# Patient Record
Sex: FEMALE | Race: WHITE | Employment: OTHER | URBAN - METROPOLITAN AREA
[De-identification: names, ages, dates, MRNs, and addresses within clinical notes are randomized per-mention and may not be internally consistent; named-entity substitution may affect disease eponyms.]

---

## 2018-01-01 ENCOUNTER — OFFICE VISIT (OUTPATIENT)
Dept: FAMILY MEDICINE CLINIC | Age: 78
End: 2018-01-01
Payer: MEDICARE

## 2018-01-01 ENCOUNTER — TELEPHONE (OUTPATIENT)
Dept: ADMINISTRATIVE | Age: 78
End: 2018-01-01

## 2018-01-01 VITALS
WEIGHT: 143 LBS | OXYGEN SATURATION: 98 % | HEART RATE: 82 BPM | HEIGHT: 64 IN | RESPIRATION RATE: 20 BRPM | DIASTOLIC BLOOD PRESSURE: 68 MMHG | BODY MASS INDEX: 24.41 KG/M2 | SYSTOLIC BLOOD PRESSURE: 124 MMHG

## 2018-01-01 VITALS
OXYGEN SATURATION: 98 % | HEART RATE: 84 BPM | DIASTOLIC BLOOD PRESSURE: 64 MMHG | HEIGHT: 64 IN | SYSTOLIC BLOOD PRESSURE: 112 MMHG | WEIGHT: 143 LBS | BODY MASS INDEX: 24.41 KG/M2

## 2018-01-01 VITALS
SYSTOLIC BLOOD PRESSURE: 136 MMHG | HEIGHT: 63 IN | DIASTOLIC BLOOD PRESSURE: 82 MMHG | HEART RATE: 67 BPM | BODY MASS INDEX: 25.16 KG/M2 | OXYGEN SATURATION: 99 % | WEIGHT: 142 LBS

## 2018-01-01 VITALS
BODY MASS INDEX: 24.07 KG/M2 | HEIGHT: 64 IN | WEIGHT: 141 LBS | TEMPERATURE: 89 F | DIASTOLIC BLOOD PRESSURE: 70 MMHG | SYSTOLIC BLOOD PRESSURE: 124 MMHG | OXYGEN SATURATION: 99 %

## 2018-01-01 DIAGNOSIS — C91.10 CLL (CHRONIC LYMPHOCYTIC LEUKEMIA) (HCC): ICD-10-CM

## 2018-01-01 DIAGNOSIS — B02.29 POSTHERPETIC NEURALGIA: ICD-10-CM

## 2018-01-01 DIAGNOSIS — R21 RASH: Primary | ICD-10-CM

## 2018-01-01 DIAGNOSIS — D69.2: Primary | ICD-10-CM

## 2018-01-01 DIAGNOSIS — Z76.89 ENCOUNTER TO ESTABLISH CARE: Primary | ICD-10-CM

## 2018-01-01 DIAGNOSIS — Z91.81 AT HIGH RISK FOR FALLS: ICD-10-CM

## 2018-01-01 PROCEDURE — 4040F PNEUMOC VAC/ADMIN/RCVD: CPT | Performed by: FAMILY MEDICINE

## 2018-01-01 PROCEDURE — G8427 DOCREV CUR MEDS BY ELIG CLIN: HCPCS | Performed by: FAMILY MEDICINE

## 2018-01-01 PROCEDURE — 1036F TOBACCO NON-USER: CPT | Performed by: FAMILY MEDICINE

## 2018-01-01 PROCEDURE — G8484 FLU IMMUNIZE NO ADMIN: HCPCS | Performed by: FAMILY MEDICINE

## 2018-01-01 PROCEDURE — G8400 PT W/DXA NO RESULTS DOC: HCPCS | Performed by: FAMILY MEDICINE

## 2018-01-01 PROCEDURE — 1123F ACP DISCUSS/DSCN MKR DOCD: CPT | Performed by: FAMILY MEDICINE

## 2018-01-01 PROCEDURE — 1090F PRES/ABSN URINE INCON ASSESS: CPT | Performed by: FAMILY MEDICINE

## 2018-01-01 PROCEDURE — 99213 OFFICE O/P EST LOW 20 MIN: CPT | Performed by: FAMILY MEDICINE

## 2018-01-01 PROCEDURE — G8420 CALC BMI NORM PARAMETERS: HCPCS | Performed by: FAMILY MEDICINE

## 2018-01-01 PROCEDURE — 1101F PT FALLS ASSESS-DOCD LE1/YR: CPT | Performed by: FAMILY MEDICINE

## 2018-01-01 PROCEDURE — 99203 OFFICE O/P NEW LOW 30 MIN: CPT | Performed by: FAMILY MEDICINE

## 2018-01-01 PROCEDURE — G8419 CALC BMI OUT NRM PARAM NOF/U: HCPCS | Performed by: FAMILY MEDICINE

## 2018-01-01 RX ORDER — GABAPENTIN 300 MG/1
300 CAPSULE ORAL 3 TIMES DAILY
COMMUNITY
End: 2018-01-01 | Stop reason: SDUPTHER

## 2018-01-01 RX ORDER — VANCOMYCIN HYDROCHLORIDE 125 MG/1
125 CAPSULE ORAL EVERY OTHER DAY
COMMUNITY

## 2018-01-01 RX ORDER — GABAPENTIN 300 MG/1
600 CAPSULE ORAL 3 TIMES DAILY
Qty: 180 CAPSULE | Refills: 1 | Status: SHIPPED | OUTPATIENT
Start: 2018-01-01 | End: 2019-01-01

## 2018-01-01 RX ORDER — VALACYCLOVIR HYDROCHLORIDE 1 G/1
1000 TABLET, FILM COATED ORAL 3 TIMES DAILY
Qty: 21 TABLET | Refills: 0 | Status: CANCELLED | OUTPATIENT
Start: 2018-01-01

## 2018-01-01 RX ORDER — PREDNISOLONE ACETATE 10 MG/ML
2 SUSPENSION/ DROPS OPHTHALMIC 4 TIMES DAILY
COMMUNITY
End: 2019-01-01

## 2018-01-01 ASSESSMENT — ENCOUNTER SYMPTOMS
WHEEZING: 0
COUGH: 0
ALLERGIC/IMMUNOLOGIC NEGATIVE: 1
COUGH: 0
ABDOMINAL PAIN: 0
NAUSEA: 0
SHORTNESS OF BREATH: 0
SHORTNESS OF BREATH: 0
WHEEZING: 0
SHORTNESS OF BREATH: 0
SHORTNESS OF BREATH: 0
SORE THROAT: 0
BLOOD IN STOOL: 0
COUGH: 0
ANAL BLEEDING: 0
ALLERGIC/IMMUNOLOGIC NEGATIVE: 1
BLOOD IN STOOL: 0
BACK PAIN: 0
ALLERGIC/IMMUNOLOGIC NEGATIVE: 1
GASTROINTESTINAL NEGATIVE: 1
COUGH: 0
CHEST TIGHTNESS: 0
ABDOMINAL PAIN: 0

## 2018-01-01 ASSESSMENT — PATIENT HEALTH QUESTIONNAIRE - PHQ9
SUM OF ALL RESPONSES TO PHQ QUESTIONS 1-9: 2
2. FEELING DOWN, DEPRESSED OR HOPELESS: 1
SUM OF ALL RESPONSES TO PHQ9 QUESTIONS 1 & 2: 2
1. LITTLE INTEREST OR PLEASURE IN DOING THINGS: 1
SUM OF ALL RESPONSES TO PHQ QUESTIONS 1-9: 2

## 2018-04-06 ENCOUNTER — HOSPITAL ENCOUNTER (OUTPATIENT)
Age: 78
Setting detail: SPECIMEN
Discharge: HOME | End: 2018-04-06
Payer: MEDICARE

## 2018-08-21 NOTE — PROGRESS NOTES
Brianda Shin   Patient is a 70y.o. year old female who presents with:  Chief Complaint   Patient presents with   [id number] [facility] Records pt requested from other PCP     Patient also follows with: Dr. Jacquelyn Turner (oncology - ), Dr. Costa Nolen ([facility]), Dr. Ailin Dallas (chicken pox of the right eye)    Had been seeing Dr. Tyrese Anaya, last visit 8/13/18    HPI    Shingles, postherpetic neuralgia: diagnosed 4/2018 at the left upper back, left shoulder. Had been on acyclovir since 2016 for prophylaxis which has since been resumed. Patient complains of pain in the distribution of the the previous shingles outbreak. Pain is stable, 8/10 in intensity. Exacerbated with light touch, clothes touching the area. Remitting factors include tramadol. Tylenol, topical steroids have been ineffective. Gabapentin was increased from 200 mg 3 times a day to 300 mg 3 times a day 8/13/18 with no improvement. Began gabapentin d/t neuralgia secondary to chemotherapy. Does not feel it is helpful for the neuralgia. 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 Stable in remission since 1/2017. Sees Dr. Renate Hall every three months, goes every six weeks for port flush and bloodwork. Increased gabapentin from 600 TID to 900 TID 8/13/18 with no effect. Pain remains 8-10 in intensity. Only improvement was with tramadol. Pain is worse with light touch, clothing. Follows with ID, has had Cdiff, sepsis, MRSA. States c diff has been most problematic with three infections. Currently being treated with vancomycin 125mg every other day for at least one year, will reevaluate in one year. Printed lab results from [employer] were provided and reviewed today. Patient states blood work is done prior oncology every 6 weeks. Review of Systems   Constitutional: Positive for unexpected weight change (loss of 60lbs over past two years). Negative for chills and fatigue. Eyes: Positive for visual disturbance. Respiratory: Negative for cough, shortness of breath and wheezing. Cardiovascular: Negative for chest pain, palpitations and leg swelling. Gastrointestinal: Negative for abdominal pain and blood in stool. Genitourinary: Negative for dysuria and frequency. Musculoskeletal: Negative for arthralgias, back pain and neck pain. Neurological: Positive for numbness (feet and hands). Negative for weakness. Psychiatric/Behavioral: Negative for dysphoric mood and sleep disturbance. The patient is not nervous/anxious.       Health Maintenance Due   Topic Date Due    DTaP/Tdap/Td vaccine (1 - Tdap) 06[birth year]17/1961    DEXA (modify frequency per FRAX score)  06/17/2007    Pneumococcal high/highest risk (1 of 2 - PCV13) 06/17/2007     History    Social History     Social History    Marital status:      Spouse name: N/A    Number of children: N/A    Years of education: N/A     Occupational History    retired      Social History Main Topics    Smoking status: Never Smoker    Smokeless tobacco: Never Used    Alcohol use No    Drug use: No    Sexual activity: Yes     Partners: Male     Other Topics Concern    None     Social History Narrative    None     Medical      Diagnosis Date    ALL (acute lymphoblastic leukemia) (HCC)      Allergies  Allergies   Allergen Reactions    Ciprofloxacin Other (See Comments)     Bone pain    Lasix [Furosemide] Other (See Comments)     Headaches and Intolerance    Augmentin [Amoxicillin-Pot Clavulanate] Rash    Keflex [Cephalexin] Rash     Surgical  Past Surgical History:   Procedure Laterality Date    CATARACT REMOVAL      COLONOSCOPY      HYSTERECTOMY      KNEE CARTILAGE SURGERY Right      Family  Family History   Problem Relation Age of Onset    Diabetes Mother     Cancer Father     Cancer Sister      Medications:   Current Outpatient Prescriptions   Medication Sig Dispense Refill    prednisoLONE acetate (PRED FORTE) 1 % ophthalmic suspension Place 2 drops into the right eye 4 times daily      vancomycin (VANCOCIN) 125 MG capsule Take 125 mg by mouth every other day      gabapentin (NEURONTIN) 300 MG capsule Take 2 capsules by mouth 3 times daily for 60 days. . 180 capsule 1    Lidocaine 4 % GEL Apply to affected area as needed for pain 1 Tube 3    acyclovir (ZOVIRAX) 400 MG tablet Take 400 mg by mouth 2 times daily      Multiple Vitamins-Minerals (THERAPEUTIC MULTIVITAMIN-MINERALS) tablet Take 1 tablet by mouth daily      dasatinib (SPRYCEL) 100 MG chemo tablet Take 100 mg by mouth nightly       calcium carbonate-vitamin D (CALTRATE 600+D) 600-400 MG-UNIT TABS per tab Take 1 tablet by mouth daily      prochlorperazine (COMPAZINE) 10 MG tablet Take 10 mg by mouth every 6 hours as needed      polyethyl glycol-propyl glycol 0.4-0.3 % (SYSTANE) 0.4-0.3 % ophthalmic solution Place 1 drop into both eyes daily      pantoprazole (PROTONIX) 20 MG tablet Take 20 mg by mouth daily as needed (stomach ulcers)       prochlorperazine (COMPAZINE) 10 MG tablet Take 10 mg by mouth daily        No current facility-administered medications for this visit. OBJECTIVE    /68 (Site: Left Arm, Position: Sitting, Cuff Size: Medium Adult)   Pulse 82   Resp 20   Ht 5' 3.5\" (1.613 m)   Wt 143 lb (64.9 kg)   LMP  (LMP Unknown)   SpO2 98%   BMI 24.93 kg/m²     Wt Readings from Last 3 Encounters:   08/21/18 143 lb (64.9 kg)   12/30/16 191 lb (86.6 kg)   11/17/16 180 lb (81.6 kg)       Physical Exam   Constitutional: She is oriented to person, place, and time. No distress. HENT:   Head: Normocephalic and atraumatic. Right Ear: External ear normal.   Left Ear: External ear normal.   Nose: Nose normal.   Mouth/Throat: Oropharynx is clear and moist.   Eyes: Pupils are equal, round, and reactive to light. Conjunctivae are normal.   Neck: Neck supple. Carotid bruit is present (R). No thyromegaly present.    Cardiovascular: Normal rate, regular rhythm and intact distal pulses. Murmur heard. Systolic murmur is present with a grade of 1/6   Pulmonary/Chest: Effort normal and breath sounds normal.   Abdominal: Soft. Bowel sounds are normal. There is no tenderness. Musculoskeletal: She exhibits no edema. Lymphadenopathy:     She has no cervical adenopathy. Neurological: She is alert and oriented to person, place, and time. She has normal strength and normal reflexes. No cranial nerve deficit or sensory deficit. Skin: Skin is warm and dry. She is not diaphoretic. Area illustrated with faint, well healing rash. Area is tender to light palpation. Psychiatric: She has a normal mood and affect. Her behavior is normal.   Osteopathic: Normal kyphotic and lordotic curves. No acute somatic dysfunction of the cervical, thoracic, or lumbar spine. ASSESSMENT AND PLAN    1. Encounter to establish care    2. Postherpetic neuralgia  Increase gabapentin dose, begin topical lidocaine. Discussed instructions for gradually increasing gabapentin; week 1:300 mg a.m., 300 mg noon, 600 mg p.m. Week 2:600 mg a.m., 300 mg noon 600milligrams p.m. Week 3 600 mg 3 times daily. Advised patient that if she expresses any adverse effects with increasing the dose to call the office. If no improvement with increase in the dose may consider transitioning to a different treatment including possibly Elavil.  - gabapentin (NEURONTIN) 300 MG capsule; Take 2 capsules by mouth 3 times daily for 60 days. .  Dispense: 180 capsule; Refill: 1  - Lidocaine 4 % GEL; Apply to affected area as needed for pain  Dispense: 1 Tube; Refill: 3    3.  CLL (chronic lymphocytic leukemia) ([facility] [address]  As per oncology    Return in about 1 month (around 9/21/2018) for follow-up.      Call or go to ED immediately if symptoms worsen or persist.    Educational materials and/or home exercises printed for patient's review and were included in patient instructions on his/her After Visit Summary

## 2018-09-05 NOTE — PROGRESS NOTES
ear normal.   Eyes: Conjunctivae are normal.   Neck: Neck supple. Cardiovascular: Normal rate, regular rhythm and intact distal pulses. Murmur heard. Pulmonary/Chest: Effort normal and breath sounds normal.   Abdominal: Soft. Bowel sounds are normal. There is no tenderness. Musculoskeletal: She exhibits no edema. Neurological: She is alert and oriented to person, place, and time. No cranial nerve deficit. Skin: Skin is warm and dry. Rash (petechial/purpuric rash as illustrated) noted. She is not diaphoretic. /64 (Site: Right Arm, Position: Sitting, Cuff Size: Medium Adult)   Pulse 84   Ht 5' 3.5\" (1.613 m)   Wt 143 lb (64.9 kg)   LMP  (LMP Unknown)   SpO2 98%   BMI 24.93 kg/m²     Assessment and Plan:      1. Purpura of skin due to mechanical force [facility])  Patient with hx of CLL and known thrombocytopenia. Petechial, purpuric rash following distribution as illustrated were she had been scratching her back. No other petechia, purpura, ecchymoses noted on exam. Reports blood work done one week ago was stable. Reassured the patient and her , advised patient to refrain from scratching area. Advised to call if any other rashes were to develop as this could indicate an underlying systemic process or worsening thrombocytopenia. Refill triamcinolone and apply to the area as needed to help control pruritus. - triamcinolone (KENALOG) 0.1 % ointment; Apply to affected area twice daily as needed for itching for up to 7 days.   Dispense: 1 Tube; Refill: 1    Call or go to ED immediately if symptoms worsen or persist.    Educational materials and/or home exercises printed for patient's review and were included in patient instructions on his/her After Visit Summary and given to patient at the end of visit.       Counseled regarding above diagnosis, including possible risks and complications, especially if left uncontrolled.     Counseled regarding the possible side effects, risks, benefits and alternatives to treatment; patient and/or guardian verbalizes understanding, agrees, feels comfortable with and wishes to proceed with above treatment plan.     Advised patient to call with any new medication issues, and read all Rx info from pharmacy to assure aware of all possible risks and side effects of medication before taking. Return for follow-up from previous visit, or sooner as needed.     Perfecto HAWK,   09/05/18  4:59 PM

## 2018-10-16 NOTE — PROGRESS NOTES
Wt 141 lb (64 kg)   LMP  (LMP Unknown)   SpO2 99%   BMI 24.59 kg/m²     Wt Readings from Last 3 Encounters:   10/16/18 141 lb (64 kg)   09/05/18 143 lb (64.9 kg)   08/21/18 143 lb (64.9 kg)       Physical Exam   Constitutional: She is oriented to person, place, and time. No distress. HENT:   Head: Normocephalic and atraumatic. Right Ear: External ear normal.   Left Ear: External ear normal.   Eyes: Conjunctivae are normal.   Neck: Neck supple. No thyromegaly present. Cardiovascular: Normal rate, regular rhythm and intact distal pulses. Murmur heard. Systolic murmur is present with a grade of 1/6   Pulmonary/Chest: Effort normal and breath sounds normal.   Abdominal: Soft. Bowel sounds are normal. There is no tenderness. Musculoskeletal: She exhibits no edema. Lymphadenopathy:     She has no cervical adenopathy. Neurological: She is alert and oriented to person, place, and time. She has normal reflexes. No cranial nerve deficit. Skin: Skin is warm and dry. She is not diaphoretic. Area illustrated with faint erythema, tender to light palpation. Right upper chest with well delineated area of erythema   Psychiatric: She has a normal mood and affect. Her behavior is normal.   Osteopathic: Normal kyphotic and lordotic curves. No acute somatic dysfunction of the cervical, thoracic, or lumbar spine. ASSESSMENT AND PLAN    1. Rash  Patient is a somewhat poor historian. Review of medical records suggests that triamcinolone may have been effective for the rash. Continue valacyclovir, restart triamcinolone and refer to dermatology for further evaluation and management. - triamcinolone (KENALOG) 0.1 % ointment; Apply to affected area twice daily as needed  Dispense: 1 Tube; Refill: 2  - External Referral To Dermatology    2.  At high risk for falls  Refer to physical therapy  - [place]    Return in about 2 months (around 12/16/2018) for follow-up, or sooner as

## 2018-12-17 PROBLEM — B02.29 POSTHERPETIC NEURALGIA: Status: ACTIVE | Noted: 2018-01-01

## 2018-12-17 NOTE — PROGRESS NOTES
 Ciprofloxacin Other (See Comments)     Bone pain    Lasix [Furosemide] Other (See Comments)     Headaches and Intolerance    Augmentin [Amoxicillin-Pot Clavulanate] Rash    Keflex [Cephalexin] Rash     Surgical  Past Surgical History:   Procedure Laterality Date    CATARACT REMOVAL      COLONOSCOPY      HYSTERECTOMY      KNEE CARTILAGE SURGERY Right      Family  Family History   Problem Relation Age of Onset    Diabetes Mother     Cancer Father     Cancer Sister      Medications:   Current Outpatient Prescriptions   Medication Sig Dispense Refill    triamcinolone (KENALOG) 0.1 % ointment Apply to affected area twice daily as needed 1 Tube 2    prednisoLONE acetate (PRED FORTE) 1 % ophthalmic suspension Place 2 drops into the right eye 4 times daily      gabapentin (NEURONTIN) 300 MG capsule Take 2 capsules by mouth 3 times daily for 60 days. . 180 capsule 1    Lidocaine 4 % GEL Apply to affected area as needed for pain 1 Tube 3    calcium carbonate-vitamin D (CALTRATE 600+D) 600-400 MG-UNIT TABS per tab Take 1 tablet by mouth daily      prochlorperazine (COMPAZINE) 10 MG tablet Take 10 mg by mouth every 6 hours as needed      polyethyl glycol-propyl glycol 0.4-0.3 % (SYSTANE) 0.4-0.3 % ophthalmic solution Place 1 drop into both eyes daily      acyclovir (ZOVIRAX) 400 MG tablet Take 400 mg by mouth 2 times daily      Multiple Vitamins-Minerals (THERAPEUTIC MULTIVITAMIN-MINERALS) tablet Take 1 tablet by mouth daily      pantoprazole (PROTONIX) 20 MG tablet Take 20 mg by mouth daily as needed (stomach ulcers)       dasatinib (SPRYCEL) 100 MG chemo tablet Take 100 mg by mouth nightly       vancomycin (VANCOCIN) 125 MG capsule Take 125 mg by mouth every other day       No current facility-administered medications for this visit.         OBJECTIVE    /82 (Site: Left Upper Arm, Position: Sitting, Cuff Size: Medium Adult)   Pulse 67   Ht 5' 3\" (1.6 m)   Wt 142 lb (64.4 kg)   LMP  (LMP understanding, agrees, feels comfortable with and wishes to proceed with above treatment plan.     Advised patient to call with any new medication issues, and read all Rx info from pharmacy to assure aware of all possible risks and side effects of medication before taking.     Patrizia Starr DO  12/17/18  8:37 PM

## 2019-01-01 ENCOUNTER — OFFICE VISIT (OUTPATIENT)
Dept: FAMILY MEDICINE CLINIC | Age: 79
End: 2019-01-01
Payer: MEDICARE

## 2019-01-01 ENCOUNTER — HOSPITAL ENCOUNTER (OUTPATIENT)
Age: 79
Discharge: HOME | End: 2019-05-18
Payer: MEDICARE

## 2019-01-01 ENCOUNTER — HOSPITAL ENCOUNTER (EMERGENCY)
Age: 79
Discharge: ANOTHER HOSPITAL | End: 2019-05-17
Attending: EMERGENCY MEDICINE
Payer: MEDICARE

## 2019-01-01 ENCOUNTER — APPOINTMENT (OUTPATIENT)
Dept: CT IMAGING | Age: 79
End: 2019-01-01
Payer: MEDICARE

## 2019-01-01 ENCOUNTER — TELEPHONE (OUTPATIENT)
Dept: FAMILY MEDICINE CLINIC | Age: 79
End: 2019-01-01

## 2019-01-01 VITALS
HEART RATE: 98 BPM | BODY MASS INDEX: 26.93 KG/M2 | OXYGEN SATURATION: 96 % | DIASTOLIC BLOOD PRESSURE: 62 MMHG | SYSTOLIC BLOOD PRESSURE: 104 MMHG | HEIGHT: 63 IN

## 2019-01-01 VITALS
RESPIRATION RATE: 16 BRPM | WEIGHT: 150 LBS | TEMPERATURE: 97.6 F | HEART RATE: 78 BPM | HEIGHT: 63 IN | SYSTOLIC BLOOD PRESSURE: 140 MMHG | DIASTOLIC BLOOD PRESSURE: 70 MMHG | BODY MASS INDEX: 26.58 KG/M2 | OXYGEN SATURATION: 93 %

## 2019-01-01 VITALS
WEIGHT: 152 LBS | BODY MASS INDEX: 26.93 KG/M2 | HEIGHT: 63 IN | OXYGEN SATURATION: 93 % | DIASTOLIC BLOOD PRESSURE: 72 MMHG | SYSTOLIC BLOOD PRESSURE: 130 MMHG | HEART RATE: 95 BPM

## 2019-01-01 DIAGNOSIS — R29.898 WEAKNESS OF BOTH LOWER EXTREMITIES: Primary | ICD-10-CM

## 2019-01-01 DIAGNOSIS — D72.829 LEUKOCYTOSIS, UNSPECIFIED TYPE: ICD-10-CM

## 2019-01-01 DIAGNOSIS — N28.9 ACUTE RENAL INSUFFICIENCY: ICD-10-CM

## 2019-01-01 DIAGNOSIS — R53.1 GENERALIZED WEAKNESS: Primary | ICD-10-CM

## 2019-01-01 DIAGNOSIS — J06.9 VIRAL URI: Primary | ICD-10-CM

## 2019-01-01 DIAGNOSIS — J90 RECURRENT RIGHT PLEURAL EFFUSION: ICD-10-CM

## 2019-01-01 DIAGNOSIS — R29.898 WEAKNESS OF BOTH LOWER EXTREMITIES: ICD-10-CM

## 2019-01-01 LAB
ALBUMIN SERPL-MCNC: 4 G/DL (ref 3.5–5.2)
ALBUMIN SERPL-MCNC: 4 G/DL (ref 3.5–5.2)
ALP BLD-CCNC: 63 U/L (ref 35–104)
ALP BLD-CCNC: 65 U/L (ref 35–104)
ALT SERPL-CCNC: 14 U/L (ref 0–32)
ALT SERPL-CCNC: 15 U/L (ref 0–32)
AMORPHOUS: ABNORMAL
ANION GAP SERPL CALCULATED.3IONS-SCNC: 14 MMOL/L (ref 7–16)
ANION GAP SERPL CALCULATED.3IONS-SCNC: 20 MMOL/L (ref 7–16)
ANISOCYTOSIS: ABNORMAL
APTT: 30.4 SEC (ref 24.5–35.1)
AST SERPL-CCNC: 49 U/L (ref 0–31)
AST SERPL-CCNC: 54 U/L (ref 0–31)
BACTERIA: ABNORMAL /HPF
BASOPHILS ABSOLUTE: 0 E9/L (ref 0–0.2)
BASOPHILS RELATIVE PERCENT: 0 % (ref 0–2)
BILIRUB SERPL-MCNC: 0.4 MG/DL (ref 0–1.2)
BILIRUB SERPL-MCNC: 0.5 MG/DL (ref 0–1.2)
BILIRUBIN URINE: ABNORMAL
BLASTS RELATIVE PERCENT: 72 % (ref 0–0)
BLOOD, URINE: NEGATIVE
BUN BLDV-MCNC: 12 MG/DL (ref 8–23)
BUN BLDV-MCNC: 14 MG/DL (ref 8–23)
CALCIUM SERPL-MCNC: 10.4 MG/DL (ref 8.6–10.2)
CALCIUM SERPL-MCNC: 10.8 MG/DL (ref 8.6–10.2)
CHLORIDE BLD-SCNC: 102 MMOL/L (ref 98–107)
CHLORIDE BLD-SCNC: 98 MMOL/L (ref 98–107)
CLARITY: CLEAR
CO2: 20 MMOL/L (ref 22–29)
CO2: 26 MMOL/L (ref 22–29)
COLOR: YELLOW
CREAT SERPL-MCNC: 1.6 MG/DL (ref 0.5–1)
CREAT SERPL-MCNC: 1.7 MG/DL (ref 0.5–1)
EKG ATRIAL RATE: 83 BPM
EKG P AXIS: 45 DEGREES
EKG P-R INTERVAL: 142 MS
EKG Q-T INTERVAL: 412 MS
EKG QRS DURATION: 136 MS
EKG QTC CALCULATION (BAZETT): 484 MS
EKG R AXIS: 82 DEGREES
EKG T AXIS: 8 DEGREES
EKG VENTRICULAR RATE: 83 BPM
EOSINOPHILS ABSOLUTE: 0 E9/L (ref 0.05–0.5)
EOSINOPHILS RELATIVE PERCENT: 0 % (ref 0–6)
EPITHELIAL CELLS, UA: ABNORMAL /HPF
FOLATE: 17.4 NG/ML (ref 4.8–24.2)
GFR AFRICAN AMERICAN: 35
GFR AFRICAN AMERICAN: 38
GFR NON-AFRICAN AMERICAN: 29 ML/MIN/1.73
GFR NON-AFRICAN AMERICAN: 31 ML/MIN/1.73
GLUCOSE BLD-MCNC: 102 MG/DL (ref 74–99)
GLUCOSE BLD-MCNC: 124 MG/DL (ref 74–99)
GLUCOSE URINE: NEGATIVE MG/DL
HCT VFR BLD CALC: 32.9 % (ref 34–48)
HCT VFR BLD CALC: 38.6 % (ref 34–48)
HEMOGLOBIN: 10.5 G/DL (ref 11.5–15.5)
HEMOGLOBIN: 11.8 G/DL (ref 11.5–15.5)
INR BLD: 1
KETONES, URINE: ABNORMAL MG/DL
LEUKOCYTE ESTERASE, URINE: ABNORMAL
LYMPHOCYTES ABSOLUTE: 49.99 E9/L (ref 1.5–4)
LYMPHOCYTES RELATIVE PERCENT: 24 % (ref 20–42)
MAGNESIUM: 1.9 MG/DL (ref 1.6–2.6)
MCH RBC QN AUTO: 29.8 PG (ref 26–35)
MCH RBC QN AUTO: 30.4 PG (ref 26–35)
MCHC RBC AUTO-ENTMCNC: 30.6 % (ref 32–34.5)
MCHC RBC AUTO-ENTMCNC: 31.9 % (ref 32–34.5)
MCV RBC AUTO: 93.5 FL (ref 80–99.9)
MCV RBC AUTO: 99.5 FL (ref 80–99.9)
MONOCYTES ABSOLUTE: 2.08 E9/L (ref 0.1–0.95)
MONOCYTES RELATIVE PERCENT: 1 % (ref 2–12)
NEUTROPHILS ABSOLUTE: 6.25 E9/L (ref 1.8–7.3)
NEUTROPHILS RELATIVE PERCENT: 3 % (ref 43–80)
NITRITE, URINE: NEGATIVE
PDW BLD-RTO: 16.8 FL (ref 11.5–15)
PDW BLD-RTO: 17.2 FL (ref 11.5–15)
PH UA: 5 (ref 5–9)
PHOSPHORUS: 1.7 MG/DL (ref 2.5–4.5)
PLATELET # BLD: 104 E9/L (ref 130–450)
PLATELET # BLD: 105 E9/L (ref 130–450)
PMV BLD AUTO: 10.6 FL (ref 7–12)
PMV BLD AUTO: 9.7 FL (ref 7–12)
POLYCHROMASIA: ABNORMAL
POTASSIUM SERPL-SCNC: 3.8 MMOL/L (ref 3.5–5)
POTASSIUM SERPL-SCNC: 3.8 MMOL/L (ref 3.5–5)
PRO-BNP: 979 PG/ML (ref 0–450)
PROTEIN UA: ABNORMAL MG/DL
PROTHROMBIN TIME: 11.6 SEC (ref 9.3–12.4)
RBC # BLD: 3.52 E12/L (ref 3.5–5.5)
RBC # BLD: 3.88 E12/L (ref 3.5–5.5)
RBC UA: ABNORMAL /HPF (ref 0–2)
RENAL EPITHELIAL, UA: ABNORMAL /HPF
SODIUM BLD-SCNC: 138 MMOL/L (ref 132–146)
SODIUM BLD-SCNC: 142 MMOL/L (ref 132–146)
SPECIFIC GRAVITY UA: 1.02 (ref 1–1.03)
TOTAL PROTEIN: 7.6 G/DL (ref 6.4–8.3)
TOTAL PROTEIN: 7.9 G/DL (ref 6.4–8.3)
TROPONIN: 0.02 NG/ML (ref 0–0.03)
URINE CULTURE, ROUTINE: NORMAL
UROBILINOGEN, URINE: 0.2 E.U./DL
VITAMIN B-12: 864 PG/ML (ref 211–946)
WBC # BLD: 173.5 E9/L (ref 4.5–11.5)
WBC # BLD: 208.3 E9/L (ref 4.5–11.5)
WBC UA: ABNORMAL /HPF (ref 0–5)

## 2019-01-01 PROCEDURE — 84100 ASSAY OF PHOSPHORUS: CPT

## 2019-01-01 PROCEDURE — 80053 COMPREHEN METABOLIC PANEL: CPT

## 2019-01-01 PROCEDURE — G8427 DOCREV CUR MEDS BY ELIG CLIN: HCPCS | Performed by: FAMILY MEDICINE

## 2019-01-01 PROCEDURE — G8400 PT W/DXA NO RESULTS DOC: HCPCS | Performed by: FAMILY MEDICINE

## 2019-01-01 PROCEDURE — 85025 COMPLETE CBC W/AUTO DIFF WBC: CPT

## 2019-01-01 PROCEDURE — 2580000003 HC RX 258: Performed by: PHYSICIAN ASSISTANT

## 2019-01-01 PROCEDURE — 36415 COLL VENOUS BLD VENIPUNCTURE: CPT

## 2019-01-01 PROCEDURE — 99284 EMERGENCY DEPT VISIT MOD MDM: CPT

## 2019-01-01 PROCEDURE — 85027 COMPLETE CBC AUTOMATED: CPT

## 2019-01-01 PROCEDURE — 83735 ASSAY OF MAGNESIUM: CPT

## 2019-01-01 PROCEDURE — 4040F PNEUMOC VAC/ADMIN/RCVD: CPT | Performed by: FAMILY MEDICINE

## 2019-01-01 PROCEDURE — 1090F PRES/ABSN URINE INCON ASSESS: CPT | Performed by: FAMILY MEDICINE

## 2019-01-01 PROCEDURE — 85610 PROTHROMBIN TIME: CPT

## 2019-01-01 PROCEDURE — 82746 ASSAY OF FOLIC ACID SERUM: CPT

## 2019-01-01 PROCEDURE — 87088 URINE BACTERIA CULTURE: CPT

## 2019-01-01 PROCEDURE — 93010 ELECTROCARDIOGRAM REPORT: CPT | Performed by: INTERNAL MEDICINE

## 2019-01-01 PROCEDURE — 1123F ACP DISCUSS/DSCN MKR DOCD: CPT | Performed by: FAMILY MEDICINE

## 2019-01-01 PROCEDURE — 83880 ASSAY OF NATRIURETIC PEPTIDE: CPT

## 2019-01-01 PROCEDURE — 93005 ELECTROCARDIOGRAM TRACING: CPT | Performed by: PHYSICIAN ASSISTANT

## 2019-01-01 PROCEDURE — 1036F TOBACCO NON-USER: CPT | Performed by: FAMILY MEDICINE

## 2019-01-01 PROCEDURE — 85730 THROMBOPLASTIN TIME PARTIAL: CPT

## 2019-01-01 PROCEDURE — 82607 VITAMIN B-12: CPT

## 2019-01-01 PROCEDURE — 81001 URINALYSIS AUTO W/SCOPE: CPT

## 2019-01-01 PROCEDURE — G8419 CALC BMI OUT NRM PARAM NOF/U: HCPCS | Performed by: FAMILY MEDICINE

## 2019-01-01 PROCEDURE — 99213 OFFICE O/P EST LOW 20 MIN: CPT | Performed by: FAMILY MEDICINE

## 2019-01-01 PROCEDURE — 99214 OFFICE O/P EST MOD 30 MIN: CPT | Performed by: FAMILY MEDICINE

## 2019-01-01 PROCEDURE — 70450 CT HEAD/BRAIN W/O DYE: CPT

## 2019-01-01 PROCEDURE — 84484 ASSAY OF TROPONIN QUANT: CPT

## 2019-01-01 RX ORDER — ECHINACEA PURPUREA EXTRACT 125 MG
1 TABLET ORAL PRN
Qty: 1 BOTTLE | Refills: 0 | Status: SHIPPED | OUTPATIENT
Start: 2019-01-01

## 2019-01-01 RX ORDER — SODIUM CHLORIDE 9 MG/ML
INJECTION, SOLUTION INTRAVENOUS CONTINUOUS
Status: DISCONTINUED | OUTPATIENT
Start: 2019-01-01 | End: 2019-01-01 | Stop reason: HOSPADM

## 2019-01-01 RX ORDER — GUAIFENESIN/DEXTROMETHORPHAN 100-10MG/5
5 SYRUP ORAL 3 TIMES DAILY PRN
Qty: 120 ML | Refills: 0 | Status: SHIPPED | OUTPATIENT
Start: 2019-01-01 | End: 2019-01-01

## 2019-01-01 RX ADMIN — SODIUM CHLORIDE: 9 INJECTION, SOLUTION INTRAVENOUS at 22:39

## 2019-01-01 ASSESSMENT — ENCOUNTER SYMPTOMS
SINUS PAIN: 0
COUGH: 1
RHINORRHEA: 1
ABDOMINAL PAIN: 0
BACK PAIN: 0
SHORTNESS OF BREATH: 0
COUGH: 0
SORE THROAT: 0
SHORTNESS OF BREATH: 0

## 2019-01-01 ASSESSMENT — PATIENT HEALTH QUESTIONNAIRE - PHQ9
SUM OF ALL RESPONSES TO PHQ9 QUESTIONS 1 & 2: 0
1. LITTLE INTEREST OR PLEASURE IN DOING THINGS: 0
SUM OF ALL RESPONSES TO PHQ QUESTIONS 1-9: 0
2. FEELING DOWN, DEPRESSED OR HOPELESS: 0
SUM OF ALL RESPONSES TO PHQ QUESTIONS 1-9: 0

## 2019-04-12 NOTE — PROGRESS NOTES
Earle Renteria   Patient is a 70y.o. year old female who presents with:  Chief Complaint   Patient presents with    Chest Congestion     Deep cough and Mucas, x3 days    Health Maintenance     thinks she had pneu 13 at [facility] i will find out. HPI    Cough   Onset: 3 days ago. Progression: gradually improving  Timing: persistent  Symptoms: cough is productive of thin yellow sputum, had mild sore throat initially which improve  Remitting factors: OTC cough and cold medication  Admits to rhinorrhea, temperature 99.7 last night  Denies myalga. Concerned about possibility of pneumonia d/t comorbidity, medical hx  Per the patient XR two months ago showed resolving effusion which was felt to be d/t medication side effect. Plans to have repeat cxr 4/23/19. Currently taking a prednisone taper which was prescribed by heme/onc. Review of Systems   Constitutional: Positive for fatigue (chronic, unchanged) and fever. Negative for chills. HENT: Positive for congestion and rhinorrhea. Negative for ear pain, sinus pain and sore throat. Respiratory: Positive for cough. Negative for shortness of breath. Cardiovascular: Negative for chest pain and leg swelling. Musculoskeletal: Negative for myalgias.        Health Maintenance Due   Topic Date Due    DTaP/Tdap/Td vaccine (1 - Tdap) 06[birth year]17/1961    DEXA (modify frequency per FRAX score)  06/17/2007    Pneumococcal 65+ years Vaccine (1 of 2 - PCV13) 06/17/2007       Medications:   Current Outpatient Medications   Medication Sig Dispense Refill    guaiFENesin-dextromethorphan (ROBITUSSIN DM) 100-10 MG/5ML syrup Take 5 mLs by mouth 3 times daily as needed for Cough 120 mL 0    sodium chloride (OCEAN) 0.65 % nasal spray 1 spray by Nasal route as needed for Congestion 1 Bottle 0    triamcinolone (KENALOG) 0.1 % ointment Apply to affected area twice daily as needed 1 Tube 2    prednisoLONE acetate (PRED FORTE) 1 % ophthalmic suspension Place 2 drops into the right eye 4 times daily      vancomycin (VANCOCIN) 125 MG capsule Take 125 mg by mouth every other day      gabapentin (NEURONTIN) 300 MG capsule Take 2 capsules by mouth 3 times daily for 60 days. . 180 capsule 1    Lidocaine 4 % GEL Apply to affected area as needed for pain 1 Tube 3    calcium carbonate-vitamin D (CALTRATE 600+D) 600-400 MG-UNIT TABS per tab Take 1 tablet by mouth daily      prochlorperazine (COMPAZINE) 10 MG tablet Take 10 mg by mouth every 6 hours as needed      polyethyl glycol-propyl glycol 0.4-0.3 % (SYSTANE) 0.4-0.3 % ophthalmic solution Place 1 drop into both eyes daily      acyclovir (ZOVIRAX) 400 MG tablet Take 400 mg by mouth 2 times daily      Multiple Vitamins-Minerals (THERAPEUTIC MULTIVITAMIN-MINERALS) tablet Take 1 tablet by mouth daily      pantoprazole (PROTONIX) 20 MG tablet Take 20 mg by mouth daily as needed (stomach ulcers)       dasatinib (SPRYCEL) 100 MG chemo tablet Take 100 mg by mouth nightly        No current facility-administered medications for this visit.         History    Medical  Past Medical History:   Diagnosis Date    ALL (acute lymphoblastic leukemia) ([facility] [address]        Surgical  Past Surgical History:   Procedure Laterality Date    CATARACT REMOVAL      COLONOSCOPY      HYSTERECTOMY      KNEE CARTILAGE SURGERY Right        Allergies  Allergies   Allergen Reactions    Ciprofloxacin Other (See Comments)     Bone pain    Lasix [Furosemide] Other (See Comments)     Headaches and Intolerance    Augmentin [Amoxicillin-Pot Clavulanate] Rash    Keflex [Cephalexin] Rash       Family  Family History   Problem Relation Age of Onset    Diabetes Mother     Cancer Father     Cancer Sister        Social History     Socioeconomic History    Marital status:      Spouse name: None    Number of children: None    Years of education: None    Highest education level: None   Occupational History    Occupation: retired   Social Needs    Financial resource strain: None    Food insecurity:     Worry: None     Inability: None    Transportation needs:     Medical: None     Non-medical: None   Tobacco Use    Smoking status: Never Smoker    Smokeless tobacco: Never Used   Substance and Sexual Activity    Alcohol use: No    Drug use: No    Sexual activity: Yes     Partners: Male   Lifestyle    Physical activity:     Days per week: None     Minutes per session: None    Stress: None   Relationships    Social connections:     Talks on phone: None     Gets together: None     Attends Sikh service: None     Active member of club or organization: None     Attends meetings of clubs or organizations: None     Relationship status: None    Intimate partner violence:     Fear of current or ex partner: None     Emotionally abused: None     Physically abused: None     Forced sexual activity: None   Other Topics Concern    None   Social History Narrative    None       OBJECTIVE    /72 (Site: Right Upper Arm, Position: Sitting, Cuff Size: Medium Adult)   Pulse 95   Ht 5' 3\" (1.6 m)   Wt 152 lb (68.9 kg)   LMP  (LMP Unknown)   SpO2 93%   BMI 26.93 kg/m²     Wt Readings from Last 3 Encounters:   04/12/19 152 lb (68.9 kg)   12/17/18 142 lb (64.4 kg)   10/16/18 141 lb (64 kg)       Physical Exam   Constitutional: She is oriented to person, place, and time. No distress. HENT:   Head: Normocephalic and atraumatic. Right Ear: Ear canal normal. Tympanic membrane is not erythematous and not bulging. A middle ear effusion is present. Left Ear: Ear canal normal. Tympanic membrane is not erythematous and not bulging. A middle ear effusion is present. Nose: Mucosal edema present. No rhinorrhea. Right sinus exhibits no maxillary sinus tenderness and no frontal sinus tenderness. Left sinus exhibits no maxillary sinus tenderness and no frontal sinus tenderness.    Mouth/Throat: Uvula is midline, oropharynx is clear and moist and mucous membranes are normal. Eyes: Conjunctivae are normal.   Neck: Neck supple. Cardiovascular: Normal rate, regular rhythm, normal heart sounds and intact distal pulses. Pulmonary/Chest: Effort normal. No respiratory distress. Faint adventitious sounds at left lower lung field on the left, decreased breath sound at right lower lung field   Abdominal: Soft. There is no tenderness. Musculoskeletal: She exhibits no edema. Lymphadenopathy:     She has no cervical adenopathy. Neurological: She is alert and oriented to person, place, and time. Skin: Skin is warm and dry. She is not diaphoretic. Psychiatric: She has a normal mood and affect. Her behavior is normal.     ASSESSMENT AND PLAN    1. Viral URI  Symptoms gradually improving. Pulse ox 93%, patient reports heme/onc is following effusion. Obtain CXR to r/o pneumonia. Continue prednisone as per heme/onc, begin robitussin DM for cough and congestion, begin nasal saline. Will contact the patient by phone if needed pending CXR result. Discussed expected clinical course and s/s for which to call vs seek emergent medical attention.  - guaiFENesin-dextromethorphan (ROBITUSSIN DM) 100-10 MG/5ML syrup; Take 5 mLs by mouth 3 times daily as needed for Cough  Dispense: 120 mL; Refill: 0  - sodium chloride (OCEAN) 0.65 % nasal spray; 1 spray by Nasal route as needed for Congestion  Dispense: 1 Bottle; Refill: 0  - XR CHEST STANDARD (2 VW); Future    Return in about 6 months (around 10/12/2019), or if symptoms worsen or fail to improve. Gris Haines DO  04/12/19  12:30 PM    There are no Patient Instructions on file for this visit.

## 2019-05-16 NOTE — PROGRESS NOTES
Danni Arce   Patient is a 70y.o. year old female who presents with:  Chief Complaint   Patient presents with    Extremity Weakness     Started 5/9/19, unstable to walk   [address]     pt declined health maintance until she was feeling better     HPI    Leg Weakness  Onset: 10 days ago. Gradually worsened over a few days and has been stable over the past week. Symptoms include weakness of the b/l LE and involve the entire extremities  Weakness is equal b/l  Denies pain, admits to numbness of the b/l legs  Admits to low back pain early in onset, this has since resolved  Denies leg swelling, urinary problems, denies weakness in the upper extremities  Denies confusion, vision changes, speech difficulty, facial asymmetry  Of note patient and her  had driven to [place] 5/46/59, drove home 5/6/19. Patient has longstanding history of neuropathy secondary to chemotherapy    Review of Systems   Constitutional: Negative for chills and fever. Seated in wheel chair   Respiratory: Negative for cough and shortness of breath. Cardiovascular: Negative for chest pain and leg swelling. Gastrointestinal: Negative for abdominal pain. Genitourinary: Negative for difficulty urinating, dysuria, flank pain and frequency. Musculoskeletal: Negative for back pain. Neurological: Positive for weakness and numbness. Negative for facial asymmetry and speech difficulty. Psychiatric/Behavioral: Negative for confusion.        Health Maintenance Due   Topic Date Due    DTaP/Tdap/Td vaccine (1 - Tdap) 06[birth year]/1961    DEXA (modify frequency per FRAX score)  06/17/2007    Pneumococcal 65+ years Vaccine (1 of 2 - PCV13) 06/17/2007       Current Outpatient Medications   Medication Sig Dispense Refill    sodium chloride (OCEAN) 0.65 % nasal spray 1 spray by Nasal route as needed for Congestion 1 Bottle 0    vancomycin (VANCOCIN) 125 MG capsule Take 125 mg by mouth every other day      gabapentin (NEURONTIN) 300 MG capsule Take 2 capsules by mouth 3 times daily for 60 days. . 180 capsule 1    Lidocaine 4 % GEL Apply to affected area as needed for pain 1 Tube 3    calcium carbonate-vitamin D (CALTRATE 600+D) 600-400 MG-UNIT TABS per tab Take 1 tablet by mouth daily      prochlorperazine (COMPAZINE) 10 MG tablet Take 10 mg by mouth every 6 hours as needed      polyethyl glycol-propyl glycol 0.4-0.3 % (SYSTANE) 0.4-0.3 % ophthalmic solution Place 1 drop into both eyes daily      acyclovir (ZOVIRAX) 400 MG tablet Take 400 mg by mouth 2 times daily      Multiple Vitamins-Minerals (THERAPEUTIC MULTIVITAMIN-MINERALS) tablet Take 1 tablet by mouth daily      pantoprazole (PROTONIX) 20 MG tablet Take 20 mg by mouth daily as needed (stomach ulcers)       dasatinib (SPRYCEL) 50 MG chemo tablet Take 50 mg by mouth nightly       triamcinolone (KENALOG) 0.1 % ointment Apply to affected area twice daily as needed 1 Tube 2     No current facility-administered medications for this visit.         History    Past Medical History:   Diagnosis Date    ALL (acute lymphoblastic leukemia) (HCC)        Past Surgical History:   Procedure Laterality Date    CATARACT REMOVAL      COLONOSCOPY      HYSTERECTOMY      KNEE CARTILAGE SURGERY Right        Allergies   Allergen Reactions    Ciprofloxacin Other (See Comments)     Bone pain    Lasix [Furosemide] Other (See Comments)     Headaches and Intolerance    Augmentin [Amoxicillin-Pot Clavulanate] Rash    Keflex [Cephalexin] Rash       Family History   Problem Relation Age of Onset    Diabetes Mother     Cancer Father     Cancer Sister        Social History     Socioeconomic History    Marital status:      Spouse name: None    Number of children: None    Years of education: None    Highest education level: None   Occupational History    Occupation: retired   Social Needs    Financial resource strain: None    Food insecurity:     Worry: None     Inability: None    Transportation needs:     Medical: None     Non-medical: None   Tobacco Use    Smoking status: Never Smoker    Smokeless tobacco: Never Used   Substance and Sexual Activity    Alcohol use: No    Drug use: No    Sexual activity: Yes     Partners: Male   Lifestyle    Physical activity:     Days per week: None     Minutes per session: None    Stress: None   Relationships    Social connections:     Talks on phone: None     Gets together: None     Attends L[employer] service: None     Active member of club or organization: None     Attends meetings of clubs or organizations: None     Relationship status: None    Intimate partner violence:     Fear of current or ex partner: None     Emotionally abused: None     Physically abused: None     Forced sexual activity: None   Other Topics Concern    None   Social History Narrative    None       OBJECTIVE    /62 (Site: Right Upper Arm, Position: Sitting, Cuff Size: Medium Adult)   Pulse 98   Ht 5' 3\" (1.6 m)   LMP  (LMP Unknown)   SpO2 96%   BMI 26.93 kg/m²     Wt Readings from Last 3 Encounters:   04/12/19 152 lb (68.9 kg)   12/17/18 142 lb (64.4 kg)   10/16/18 141 lb (64 kg)       Physical Exam   Constitutional: She is oriented to person, place, and time. No distress. HENT:   Head: Normocephalic and atraumatic. Eyes: Pupils are equal, round, and reactive to light. EOM are normal.   Neck: Neck supple. Carotid bruit is not present. Cardiovascular: Normal rate and regular rhythm. Murmur heard. Systolic murmur is present with a grade of 1/6. Pulmonary/Chest: Effort normal. No respiratory distress. She has decreased breath sounds in the right middle field and the right lower field. She has no wheezes. She has no rales. Abdominal: Soft. There is no tenderness. There is no guarding. Musculoskeletal: She exhibits no edema. Right lower leg: She exhibits no tenderness and no edema.         Left lower leg: She exhibits no tenderness and no edema. Neurological: She is alert and oriented to person, place, and time. She displays abnormal reflex. She displays no tremor. No cranial nerve deficit or sensory deficit. Gait abnormal. Coordination normal.   Reflex Scores:       Brachioradialis reflexes are 2+ on the right side and 2+ on the left side. Patellar reflexes are 0 on the right side and 0 on the left side. Achilles reflexes are 0 on the right side and 0 on the left side. Strength: UE 5/5 throughout b/l. Hip flexion 4/5 b/l, otherwise LE strength 5/5 throughout   Skin: Skin is warm and dry. She is not diaphoretic. Psychiatric: She has a normal mood and affect. Her speech is normal and behavior is normal.     ASSESSMENT AND PLAN    1. Weakness of both lower extremities  Obtain relevant imaging and lab work and refer to PM&R.  *Results reviewed after todays visit showing TRAM, lumbar spondylosis, and interval worsening of right pleural effusion which had been being followed by heme/onc at [facility] (records scanned). TRAM possibly secondary to urinary retention? Patient was contacted by phone and advised to go to the ED for emergent workup. - Comprehensive Metabolic Panel; Future  - CBC Auto Differential; Future  - Vitamin B12 & Folate; Future  - XR CHEST STANDARD (2 VW); Future  - [facility]  - XR LUMBAR SPINE (MIN 4 VIEWS); Future    Return if symptoms worsen or fail to improve, for Discussed expected clinical course., Discussed s/s for which to call vs go to ED. Jennifer Kelly DO  05/16/19  7:20 PM    There are no Patient Instructions on file for this visit.

## 2019-05-17 NOTE — ED NOTES
Jimena Burton would like to be notified when pt is transferred to [facility].  Phone number is [phone]     Kori Munoz RN  05/17/19 3586

## 2019-05-17 NOTE — ED NOTES
Margot Morales from [place] [address] called and she stated she called [facility] to get an update on a bed status on the patient and they told her that they wouldn't have a bed till sometime in the morning once they discharge some people, RN notified      Yossi Parikh  05/17/19 7119

## 2019-05-17 NOTE — ED NOTES
MOOSE Manzanares notified of patient pulse ox on room air while sleeping.        Yamilex Hair RN  05/17/19 0060

## 2019-05-17 NOTE — ED NOTES
Pt ambulatory to Bathroom with one assist.  Voided 300 ml of clear yellow urine.  is visiting. Patient is alert and oriented. States is on regular diet at home and Dr Tobias Langston states it is OK to order breakfast for pt.      Tom Pollock, YESSI  60/64/15 0664

## 2019-05-17 NOTE — ED PROVIDER NOTES
ED Attending  CC: No   HPI:  5/16/19, Time: 9:31 PM         Melina Bernal is a 70 y.o. female presenting to the ED for fatigue, abnormal labs beginning 1 Week  ago. The complaint has been persistent, moderate in severity, and worsened by walking. Patient came in for abnormal labs. She states she was seen earlier today by her primary care and evaluated for general weakness. She had a chest x-ray that showed a large pleural effusion as well as his renal insufficiency she was sent in for further evaluation and admission. She states she's been having increasing lower extremity weakness difficulty ambulating over the last week. She denies any headache lightheaded dizziness and blurred vision or loss of vision. She denies any numbness tingling of extremities. Patient denies any chest pain shortness of breath diaphoresis or palpitations. No abdominal pain no dysuria. Review of Systems:   Pertinent positives and negatives are stated within HPI, all other systems reviewed and are negative.          --------------------------------------------- PAST HISTORY ---------------------------------------------  Past Medical History:  has a past medical history of ALL (acute lymphoblastic leukemia) ([facility] [address] Past Surgical History:  has a past surgical history that includes Hysterectomy; Cataract removal; Knee cartilage surgery (Right); and Colonoscopy. Social History:  reports that she has never smoked. She has never used smokeless tobacco. She reports that she does not drink alcohol or use drugs. Family History: family history includes Cancer in her father and sister; Diabetes in her mother. The patients home medications have been reviewed. Allergies: Ciprofloxacin; Lasix [furosemide];  Augmentin [amoxicillin-pot clavulanate]; and Keflex [cephalexin]    -------------------------------------------------- RESULTS -------------------------------------------------  All laboratory and radiology results have been personally reviewed by myself   LABS:  Results for orders placed or performed during the hospital encounter of 05/16/19   Comprehensive Metabolic Panel   Result Value Ref Range    Sodium 142 132 - 146 mmol/L    Potassium 3.8 3.5 - 5.0 mmol/L    Chloride 102 98 - 107 mmol/L    CO2 26 22 - 29 mmol/L    Anion Gap 14 7 - 16 mmol/L    Glucose 124 (H) 74 - 99 mg/dL    BUN 14 8 - 23 mg/dL    CREATININE 1.7 (H) 0.5 - 1.0 mg/dL    GFR Non-African American 29 >=60 mL/min/1.73    GFR African American 35     Calcium 10.4 (H) 8.6 - 10.2 mg/dL    Total Protein 7.6 6.4 - 8.3 g/dL    Alb 4.0 3.5 - 5.2 g/dL    Total Bilirubin 0.4 0.0 - 1.2 mg/dL    Alkaline Phosphatase 65 35 - 104 U/L    ALT 14 0 - 32 U/L    AST 49 (H) 0 - 31 U/L   Protime-INR   Result Value Ref Range    Protime 11.6 9.3 - 12.4 sec    INR 1.0    APTT   Result Value Ref Range    aPTT 30.4 24.5 - 35.1 sec   Brain Natriuretic Peptide   Result Value Ref Range    Pro- (H) 0 - 450 pg/mL   CBC   Result Value Ref Range    .5 (H) 4.5 - 11.5 E9/L    RBC 3.52 3.50 - 5.50 E12/L    Hemoglobin 10.5 (L) 11.5 - 15.5 g/dL    Hematocrit 32.9 (L) 34.0 - 48.0 %    MCV 93.5 80.0 - 99.9 fL    MCH 29.8 26.0 - 35.0 pg    MCHC 31.9 (L) 32.0 - 34.5 %    RDW 16.8 (H) 11.5 - 15.0 fL    Platelets 868 (L) 996 - 450 E9/L    MPV 9.7 7.0 - 12.0 fL   Troponin   Result Value Ref Range    Troponin 0.02 0.00 - 0.03 ng/mL   Magnesium   Result Value Ref Range    Magnesium 1.9 1.6 - 2.6 mg/dL   Phosphorus   Result Value Ref Range    Phosphorus 1.7 (L) 2.5 - 4.5 mg/dL   EKG 12 Lead   Result Value Ref Range    Ventricular Rate 83 BPM    Atrial Rate 83 BPM    P-R Interval 142 ms    QRS Duration 136 ms    Q-T Interval 412 ms    QTc Calculation (Bazett) 484 ms    P Axis 45 degrees    R Axis 82 degrees    T Axis 8 degrees       RADIOLOGY:  Interpreted by Radiologist.  CT Head WO Contrast   Final Result   1. There is no acute intracranial abnormality. 2. Atrophy and periventricular leukomalacia. ------------------------- NURSING NOTES AND VITALS REVIEWED ---------------------------   The nursing notes within the ED encounter and vital signs as below have been reviewed. BP (!) 140/70   Pulse 78   Temp 97.6 °F (36.4 °C)   Resp 16   Ht 5' 3\" (1.6 m)   Wt 150 lb (68 kg)   LMP  (LMP Unknown)   SpO2 93%   BMI 26.57 kg/m²   Oxygen Saturation Interpretation: Normal      ---------------------------------------------------PHYSICAL EXAM--------------------------------------      Constitutional/General: Alert and oriented x3  Head: Normocephalic and atraumatic  Eyes: PERRL, EOMI  Mouth: Oropharynx clear, handling secretions, no trismus  Neck: Supple, full ROM,   Pulmonary: Lungs clear to auscultation bilaterally, no wheezes, rales, or rhonchi. Not in respiratory distress  Cardiovascular:  Regular rate and rhythm, no murmurs, gallops, or rubs. 2+ distal pulses  Abdomen: Soft, non tender, non distended,   Extremities: Moves all extremities x 4. Warm and well perfused normal strength and sensation. Skin: warm and dry without rash  Neurologic: GCS 15,  Psych: Normal Affect      ------------------------------ ED COURSE/MEDICAL DECISION MAKING----------------------  Medications - No data to display      ED COURSE:   review of labs shows elevated creatinine of 1.6, white blood cell count 208   CXR x-ray large right pleural effusion    EKG # 1  Interpreted by emergency department physician unless otherwise noted. Time:  2228   Rate: 83  Rhythm: Sinus. Interpretation: RBBB. 0 Discussed with Dr. Kaleen Olga, he states patient will need be transferred to [facility] since patients following with oncology for her ALL at [facility]     0100 patient required placement of nasal cannula 2 L and Lanoxin and dropped. 89% while sleeping.     6239 Discussed with Dr. Micheal Joyce from [facility]. They will call back when they have an accepting service    0200 Care  was transferred to  [facility]  Medical Decision Making:    Patient came in with complaint of lower extremity weakness. She had been evaluated earlier in the day where primary care was found to have a right pleural effusion which has been recurrent. She is followed with oncology at [facility] regarding her acute lymphoblastic leukemia. They feel that the right pleural effusion is due to medication related to the leukemia. She was found to be in acute renal failure with a leukocytosis of 173. Patient was transferred to [facility] to be followed by the oncology service there. Counseling: The emergency provider has spoken with the patient and discussed todays results, in addition to providing specific details for the plan of care and counseling regarding the diagnosis and prognosis. Questions are answered at this time and they are agreeable with the plan.      --------------------------------- IMPRESSION AND DISPOSITION ---------------------------------    IMPRESSION  1. Generalized weakness    2. Leukocytosis, unspecified type    3. Acute renal insufficiency    4. Recurrent right pleural effusion        DISPOSITION  Disposition: Transfer to [facility] to  Patient condition is fair      NOTE: This report was transcribed using voice recognition software. Every effort was made to ensure accuracy; however, inadvertent computerized transcription errors may be present     Ervin Hassan [facility]  05/18/19 2410      ATTENDING PROVIDER ATTESTATION:     I have personally performed and/or participated in the history, exam, medical decision making, and procedures and agree with all pertinent clinical information. I have also reviewed and agree with the past medical, family and social history unless otherwise noted. My findings/Plan: Generalized weakness and fatigue. Acute renal insufficiency Leukocytosis.  Will transfer to [facility] for evaluation by her oncologist.          Duane Zimmerman DO  05/18/19 [address] [place] [address]

## 2019-05-17 NOTE — ED NOTES
Dr Kasie Echeverria from [facility] called back and spoke with Tian Fuentes, he is accepting patient, waiting call back on bed assignment and then will set up transport      Eloy Harrison  05/17/19 1567

## 2019-05-17 NOTE — ED NOTES
[address] [facility] and spoke with Ravi Newell to check on status of call from [facility], he stated they requested a face sheet be faxed and then they were putting a call out to their physician to give us a call back      Yossi Parikh  05/17/19 6369

## 2019-05-17 NOTE — ED NOTES
Dr. Ashlie Phillips in with patient.  and patient now not sure if they want patient to be transferred. Patient and  discussing risks and beneifts of transfer to [address] [place] [address] and signing out [facility].        Nima Amezcua RN  05/17/19 9686

## 2019-09-19 ENCOUNTER — TELEPHONE (OUTPATIENT)
Dept: FAMILY MEDICINE CLINIC | Age: 79
End: 2019-09-19

## 2019-09-20 NOTE — TELEPHONE ENCOUNTER
Called and spoke to pt's  to verify.  confirmed pt's passing and wanted to let Dr Ceasar Mayers know how much they appreciated everything he did for the patient. Pt's October appt cancelled and msg forwarded to doctor.